# Patient Record
Sex: FEMALE | Race: BLACK OR AFRICAN AMERICAN | NOT HISPANIC OR LATINO | URBAN - NONMETROPOLITAN AREA
[De-identification: names, ages, dates, MRNs, and addresses within clinical notes are randomized per-mention and may not be internally consistent; named-entity substitution may affect disease eponyms.]

---

## 2024-05-22 ENCOUNTER — OFFICE (OUTPATIENT)
Dept: URBAN - NONMETROPOLITAN AREA CLINIC 1 | Facility: CLINIC | Age: 74
End: 2024-05-22
Payer: COMMERCIAL

## 2024-05-22 VITALS
DIASTOLIC BLOOD PRESSURE: 59 MMHG | HEART RATE: 51 BPM | HEIGHT: 61 IN | WEIGHT: 129 LBS | SYSTOLIC BLOOD PRESSURE: 128 MMHG

## 2024-05-22 DIAGNOSIS — Z79.02 LONG TERM (CURRENT) USE OF ANTITHROMBOTICS/ANTIPLATELETS: ICD-10-CM

## 2024-05-22 DIAGNOSIS — K57.90 DIVERTICULOSIS OF INTESTINE, PART UNSPECIFIED, WITHOUT PERFO: ICD-10-CM

## 2024-05-22 PROCEDURE — 99204 OFFICE O/P NEW MOD 45 MIN: CPT | Performed by: NURSE PRACTITIONER

## 2024-05-22 NOTE — SERVICENOTES
Risks of procedure explained to patient she wishes to proceed 
Bowel prep prescribed instructions given to patient 
Colonoscopy screening for CRC for age 
Increase fiber and water intake daily 
Stressed importance of high-fiber diet in setting of diverticulosis in the sigmoid colon 
Avoid all NSAIDs 
Cardiac clearance to hold Plavix prior to procedure will be requested

## 2024-05-22 NOTE — SERVICEHPINOTES
Patient with a history of HTN, HLD, CABG x3 in 2018 takes Plavix, thyroid disease, and diverticulosis presents to clinic today for colonoscopy consult.  Her last colonoscopy was in 2013 revealed diverticulosis of the sigmoid colon otherwise normal.  She denies any melena, hematochezia, n/v/d, abdominal pain, unintentional weight loss, fever, or change in BM.  She has a good appetite and has 1-2BM daily or QOD, denies straining wtht defecation.  SHe had CABG 2018 and takes Plavix-KAL Griffith.  Denies cardiac stents, supplemental oxygenation use, or ckd.  Denies family history of crc.  Denies upper gi complaints to speak of or dysphagia. br
br   Colonoscopy by Dr Ragland11/2013
brrocedure: The colonoscope was passed under direct visualization and was advanced with ease to the cecum, confirmed by appendiceal orifice, cecal strap (crow's foot), and ileocecal valve. The scope was withdrawn and the mucosa was carefully examined. The quality of the preparation was good. The patient was taken to the recovery area in good condition.Findings: There was evidence of diverticulosis in the sigmoid colon. Otherwise, the colon appeared to be normal.br

## 2024-08-16 PROBLEM — Z12.11 SCREENING FOR COLONIC NEOPLASIA: Status: ACTIVE | Noted: 2024-08-16

## 2024-08-16 PROBLEM — K57.30 DIVERTICULOSIS OF LARGE INTESTINE WITHOUT PERFORATION OR ABS: Status: ACTIVE | Noted: 2024-08-16

## 2024-08-16 PROBLEM — K63.5 POLYP OF COLON: Status: ACTIVE | Noted: 2024-08-16
